# Patient Record
Sex: FEMALE | Race: WHITE
[De-identification: names, ages, dates, MRNs, and addresses within clinical notes are randomized per-mention and may not be internally consistent; named-entity substitution may affect disease eponyms.]

---

## 2019-02-12 ENCOUNTER — HOSPITAL ENCOUNTER (OUTPATIENT)
Dept: HOSPITAL 47 - RADUSWWP | Age: 29
Discharge: HOME | End: 2019-02-12
Attending: OBSTETRICS & GYNECOLOGY
Payer: COMMERCIAL

## 2019-02-12 DIAGNOSIS — N83.202: Primary | ICD-10-CM

## 2019-02-12 PROCEDURE — 76856 US EXAM PELVIC COMPLETE: CPT

## 2019-02-13 NOTE — US
EXAMINATION TYPE: US pelvic complete

 

DATE OF EXAM: 2/12/2019

 

COMPARISON: NONE

 

CLINICAL HISTORY: R10.2 Pelvic Pain. right sided pelvic pain after intercourse

 

TECHNIQUE:  TA.  Transabdominal sonographic images of the pelvis were acquired.  

Date of LMP:  2 years ago, on low estrogen birth control

 

EXAM MEASUREMENTS:

 

Uterus:  5.7 x 3.9 x 3.1 cm

Endometrial Stripe: 0.6 cm

Right Ovary:  3.1 x 1.8 x 1.3 cm

Left Ovary:  3.7 x 2.7 x 2.1 cm

 

 

 

1. Uterus:  Anteverted   wnl

2. Endometrium:  wnl

3. Right Ovary:  wnl

4. Left Ovary:  multiple cystic areas versus follicles noted on the left, largest = 2.6cm

5. Bilateral Adnexa:  wnl

6. Posterior cul-de-sac:  wnl

 

 

 

IMPRESSION:

1. Multiple small left ovarian cysts. These may be functional in nature and can be confirmed with fol
low-up study in 6 weeks.

## 2019-07-02 ENCOUNTER — HOSPITAL ENCOUNTER (OUTPATIENT)
Dept: HOSPITAL 47 - RADUSWWP | Age: 29
Discharge: HOME | End: 2019-07-02
Attending: CLINIC/CENTER
Payer: OTHER GOVERNMENT

## 2019-07-02 DIAGNOSIS — R10.2: Primary | ICD-10-CM

## 2019-07-02 PROCEDURE — 76856 US EXAM PELVIC COMPLETE: CPT

## 2019-07-02 NOTE — US
EXAMINATION TYPE: US pelvic complete

 

DATE OF EXAM: 7/2/2019

 

COMPARISON: US 2019

 

CLINICAL HISTORY: R10.2 Pelvic and perineal pain. Intermittent right pelvic pain, history ovarian cys
ts, patient on birth control

 

TECHNIQUE: .  Transabdominal sonographic images of the pelvis were acquired.  

 

Date of LMP:  2016

 

EXAM MEASUREMENTS:

 

Uterus:  7.2 x 2.9 x 3.2 cm

Endometrial Stripe: 0.3 cm

Right Ovary:  3.4 x 1.7 x 2.2 cm

Left Ovary:  3.2 x 1.4 x 2.0 cm

 

 

 

1. Uterus:  anteverted

2. Endometrium:  wnl

3. Right Ovary:  wnl

4. Left Ovary:  wnl

5. Bilateral Adnexa:  wnl

6. Posterior cul-de-sac:  wnl

 

 

 

IMPRESSION: No acute process.

## 2020-08-20 ENCOUNTER — HOSPITAL ENCOUNTER (OUTPATIENT)
Dept: HOSPITAL 47 - ORWHC2ENDO | Age: 30
Discharge: HOME | End: 2020-08-20
Attending: INTERNAL MEDICINE
Payer: OTHER GOVERNMENT

## 2020-08-20 VITALS — RESPIRATION RATE: 14 BRPM | DIASTOLIC BLOOD PRESSURE: 74 MMHG | SYSTOLIC BLOOD PRESSURE: 116 MMHG | HEART RATE: 56 BPM

## 2020-08-20 VITALS — BODY MASS INDEX: 25 KG/M2

## 2020-08-20 DIAGNOSIS — G43.909: ICD-10-CM

## 2020-08-20 DIAGNOSIS — Z87.11: ICD-10-CM

## 2020-08-20 DIAGNOSIS — K21.0: Primary | ICD-10-CM

## 2020-08-20 DIAGNOSIS — K27.9: ICD-10-CM

## 2020-08-20 DIAGNOSIS — Z88.2: ICD-10-CM

## 2020-08-20 DIAGNOSIS — K29.50: ICD-10-CM

## 2020-08-20 PROCEDURE — 43239 EGD BIOPSY SINGLE/MULTIPLE: CPT

## 2020-08-20 PROCEDURE — 88305 TISSUE EXAM BY PATHOLOGIST: CPT

## 2020-08-20 PROCEDURE — 81025 URINE PREGNANCY TEST: CPT

## 2020-08-20 NOTE — P.PCN
Date of Procedure: 08/20/20


Description of Procedure: 





BRIEF HISTORY: 


Patient is a 30-year-old female with a medical history significant for peptic 

ulcer disease presenting for EGD for evaluation of peptic ulcer disease.  

Patient reports epigastric pain and a history of peptic ulcer disease diagnosed 

8 years ago at which time she was told she had 6 ulcers.  Patient started on 

Protonix twice daily with some improvement in symptoms.  Denies any NSAID use.. 





PROCEDURE PERFORMED: 


Esophagogastroduodenoscopy with biopsy.





PREOPERATIVE DIAGNOSIS: 


Peptic ulcer disease, epigastric pain, history of peptic ulcer disease. 





ESTIMATED BLOOD LOSS: 


Minimal.





IV sedation per anesthesia. 





PROCEDURE: 


After informed consent was obtained, the patient  was brought into the endoscopy

unit. IV sedation was administered by Anesthesia under continuous monitoring. 

Initially the Olympus GIF-190 video endoscope was inserted into the mouth. 

Esophagus intubated without any difficulty. It was gradually advanced into the 

stomach and duodenum and carefully examined. The bulb and the second part of the

duodenum appeared normal, with biopsies taken. The scope at this time was 

withdrawn to the stomach, adequately insufflated with air, and upon careful 

examination, mucosa of the antrum, body, cardia and the fundus appeared normal, 

except for some mild scattered erythema in the antrum and body suggestive of 

mild gastritis with biopsies taken. The scope was then withdrawn into the 

esophagus. The GE junction was located at 37 cm from the incisors and biopsied. 

The esophagus appeared normal. There were no erosions or ulcerations seen and 

the patient tolerated the procedure well. 





IMPRESSION: 


1.  Mild gastritis, antrum and body biopsied.


2.  Biopsies of the duodenum and GE junction.





RECOMMENDATIONS: 


The findings of this examination were discussed with the patient and her 

grandmother.  Okay to resume diet.  Okay to resume medications.  Await pathology

from biopsies.  Continue Protonix therapy for now, can titrate off of the 

medication as tolerated.